# Patient Record
Sex: FEMALE | Race: WHITE | Employment: FULL TIME | URBAN - METROPOLITAN AREA
[De-identification: names, ages, dates, MRNs, and addresses within clinical notes are randomized per-mention and may not be internally consistent; named-entity substitution may affect disease eponyms.]

---

## 2017-04-04 ENCOUNTER — HOSPITAL ENCOUNTER (OUTPATIENT)
Dept: PHYSICAL THERAPY | Age: 52
Discharge: HOME | End: 2017-04-04
Payer: COMMERCIAL

## 2017-04-04 PROCEDURE — 97110 THERAPEUTIC EXERCISES: CPT

## 2017-04-04 PROCEDURE — 97162 PT EVAL MOD COMPLEX 30 MIN: CPT

## 2017-04-04 PROCEDURE — 97033 APP MDLTY 1+IONTPHRSIS EA 15: CPT

## 2017-04-04 NOTE — PROGRESS NOTES
PHYSICAL THERAPY - DAILY TREATMENT NOTE    Patient Name: Brittany Zamorano        Date: 2017  : 1965    Patient  Verified: YES  Visit #:     Insurance: Payor: Amaury Golden / Plan: [facility] PPO / Product Type: PPO /      In time: 1:00 Out time: 1:45   Total Treatment Time: 45     Medicare Time Tracking (below)   Total Timed Codes (min):   1:1 Treatment Time:       TREATMENT AREA/ DIAGNOSIS = Left shoulder pain [M25.512]    SUBJECTIVE  Pain Level (on 0 to 10 scale):  0  / 10   Medication Changes/New allergies or changes in medical history, any new surgeries or procedures? NO    If yes, update Summary List   Subjective Functional Status/Changes:  []  No changes reported   Pt is a 46 y/o female who presents with chief c/o L shoulder pain that has been present a couple years but worsened in the past 6 months with no specific ERIKA. MRI showed biceps tendonitis. Pain ranges from 0-8/10 made worse with lifting, blow drying her hair, reaching across her body and eased with rest, ice, and naproxen. Prior Level of Function: Chronic achy shoulder pain  Current function limitations: IADLs, reaching, lifting  Patient Goal:  \"To heal it. \"     OBJECTIVE  Condition/Posture:   Increased winging of L scapula    ROM:  [] Unable to assess at this time                                           AROM                                                              PROM   Left Right  Left Right   Flexion 135 165 Flexion     Extension   Extension     Abduction 100 170 Abduction     ER @ 0 Degrees   ER @ 0 Degrees     ER @ 90 Degrees 60 90 ER @ 90 Degrees     IR @ 90 Degrees 15 58 IR @ 90 Degrees     Increased PROM flex, ER, and IR on the L compared to AROM; no change in ABD  Pain at all end ranges  Functional IR: R: T5 L: buttocks  Functional ER: R: T7 L: T7    Accessory: WNL   Left Right   Inferior Glide     Anterior Glide     Posterior Glide       Strength:   [] Unable to assess at this time L (1-5) R (1-5) Pain   Flexors P! 5 [] Yes   [] No   Abductors P! 5 [] Yes   [] No   External Rotators   [] Yes   [] No   Internal Rotators   [] Yes   [] No   Supraspinatus 4 min P! 5 [] Yes   [] No   Serratus Anterior   [] Yes   [] No   Lower Trapezius   [] Yes   [] No   Elbow Flexion   [] Yes   [] No   Elbow Extension   [] Yes   [] No     Flexibility:  Pec Minor:  Upper Trap:  Levator Scap:    Scapulohumoral Control / Rhythm:  Able to eccentrically lower with good control?  Left: [] Yes   [] No     Right: [] Yes   [] No   decreased overhead reach L; shoulder shrug    Palpation:  [] Min  [] Mod  [x] Severe    Location: L infraspinatus, ant delt, mid delt, rhomboids  [] Min  [] Mod  [] Severe    Location:  [] Min  [] Mod  [] Severe    Location:    Special Tests:  Adson's Test  [] Pos   [] Neg Yergason's Test [] Pos   [] Neg  Laxmi's Test  [] Pos   [] Neg ER Lag Sign     [] Pos   [] Neg  Neer's Test  [] Pos   [] Neg IR Lift Off Sign  [] Pos   [] Neg  Hawkin's Test  [] Pos   [] Neg AC Joint  [] Pos   [] Neg  Speed's Test  [] Pos   [] Neg SC Joint  [] Pos   [] Neg  Empty Can  [] Pos   [x] Neg Pectoral Tightness [] Pos   [] Neg  Anterior Apprehension [] Pos   [] Neg   Posterior Apprehension [] Pos   [] Neg  Other:     OBJECTIVE TREATMENT:  Modalities Rationale: [x] decrease edema/ inflammation  [x] decrease pain   [] increase tissue extensibility  [] increase muscle performance  [] decrease neural compromise  to improve patient's ability to [] perform ADLs   [] ambulate  [] perform work  [] relaxation/ sleep      min [] Estim, type/location:                                      []  att     []  unatt     []  w/US     []  w/ice    []  w/heat    min []  Mechanical Traction: type/lbs                   []  pro   []  sup   []  int   []  cont    []  before manual    []  after manual    min []  Ultrasound, settings/location:     10 min [x]  Iontophoresis w/ dexamethasone, location: L shoulder                                              [x]  take home patch       []  in clinic    min []  Ice     []  Heat    location/position:     min []  Vasopneumatic Device, press/temp:     min []  Other:    [] Skin assessment post-treatment (if applicable):    []  intact    []  redness- no adverse reaction     []redness  adverse reaction:        10  min Therapeutic Exercise:  [x]  See flow sheet   Rationale:  [x] increase ROM   [x] increase strength   [] increase endurance   [] increase motor control   [] other  to improve patients ability to [x] perform ADLs   [] ambulate  [] perform work  [] relaxation/ sleep       min Manual Therapy: Technique:         Rationale: [] decrease pain   [] decrease TP [] increase ROM/ mobility   [] increase tissue extensibility   [] decrease edema   [] reduce disc [] postural correction   [] other     to improve patient's ability to [] perform ADLs   [] ambulate  [] perform work  [] relaxation/ sleep       min Therapeutic Activity:  [] see flow sheet   Rationale:[] increase ROM   [] increase strength   [] increase balance/ proprioception   [] increase motor control   [] other   to improve patients ability to [] perform ADLs   [] ambulate  [] perform work  [] relaxation/ sleep      min Neuromuscular Re-ed:  [] see flow sheet   Rationale:[] increase ROM   [] increase strength   [] increase balance/ proprioception   [] increase motor control  [] improve safety  [] other    to improve patients ability to[] perform ADLs   [] ambulate  [] perform work  [] relaxation/ sleep                min Gait Training: To improve patients ability to:    [] perform ADLs   [] ambulate       min Patient Education:  Yes    [x] Reviewed HEP   []  Progressed/Changed HEP based on:         Other Objective/Functional Measures:       Post Treatment Pain Level (on 0 to 10) scale:   0  / 10     ASSESSMENT  []  See Progress Note/Recertification      Patient will continue to benefit from skilled PT services to modify and progress therapeutic interventions, address functional mobility deficits, address ROM deficits, address strength deficits, analyze and address soft tissue restrictions, analyze and cue movement patterns, analyze and modify body mechanics/ergonomics, assess and modify postural abnormalities and instruct in home and community integration to attain remaining goals.    Progress toward goals / Updated goals:    [] decr pain   []inc stability   []inc balance [] inc ROM[] inc strength  [] centralizing symptoms                    [] progressing  Function  [] progressing towards LTGs            []  progressing HEP       PLAN  [x]  Upgrade activities as tolerated YES Continue plan of care   []  Discharge due to :    []  Other:      Therapist: Maranda Herring, PT    Date: 4/4/2017 Time: 12:59 PM        Future Appointments  Date Time Provider Rigo Crane   4/4/2017 1:00 PM Maranda Herring, PT [facility] AT [facility]

## 2017-04-04 NOTE — PROGRESS NOTES
René Ortiz [address]  [facility] AT [place]  [address] [facility], [address], [address] - Phone: [phone]  Fax: [phone] / [address] [facility]  Patient Name: Kaye Trejo : 1965   Medical   Diagnosis: Left shoulder pain [M25.512] Treatment Diagnosis: Left shoulder pain [M25.512]   Onset Date: 10/2016     Referral Source: Michael Tirado DO Start of Care [facility]): 2017   Prior Hospitalization: See medical history Provider #: [id number]   Prior Level of Function: Chronic achy pain in L shoulder   Comorbidities: LBP surgery    Medications: Verified on Patient Summary List   The Plan of Care and following information is based on the information from the initial evaluation.   ===========================================================================================  Assessment / key information:  Pt is a 46 y/o female who presents with chief c/o L shoulder pain that has been present a couple years but worsened in the past 6 months with no specific ERIKA. MRI showed biceps tendonitis. Pain ranges from 0-8/10 made worse with lifting, blow drying her hair, reaching across her body and eased with rest, ice, and naproxen. Upon objective evaluation, pt demonstrates L scapular winging at rest, decreased and painful L shoulder A/PROM in all directions (AROM: flex: 135, ABD: 100, ER: 60, IR: 15), L functional IR (reach behind back) to buttocks, pain with resisted L shoulder flex and ABD, elbow flex, tender TrPs in L infraspinatus, ant/mid deltoid, and rhomboids, and should shrug compensation with overhead reach. Pt may benefit from myofascial trigger point dry needling to reduce pain and improve muscle function. Iontophoresis with dexamethasone done today to decrease inflammation. Functional deficits include: IADLs, reaching, and lifting.  A home exercise program was demonstrated and provided to address the above objective and functional deficits. Pt would benefit from PT to address these deficits for increased functional mobility and QOL.  ===========================================================================================  Eval Complexity: History MEDIUM  Complexity : 1-2 comorbidities / personal factors will impact the outcome/ POC ;  Examination  HIGH Complexity : 4+ Standardized tests and measures addressing body structure, function, activity limitation and / or participation in recreation ; Presentation MEDIUM Complexity : Evolving with changing characteristics ; Decision Making MEDIUM Complexity : FOTO score of 26-74; Overall Complexity MEDIUM  Problem List: pain affecting function, decrease ROM, decrease strength, decrease ADL/ functional abilitiies, decrease activity tolerance and decrease flexibility/ joint mobility   Treatment Plan may include any combination of the following: Therapeutic exercise, Therapeutic activities, Neuromuscular re-education, Physical agent/modality and Manual therapy including Myofascial Trigger Point Dry Needling (TDN), and Patient Education  Patient / Family readiness to learn indicated by: asking questions, trying to perform skills and interest  Persons(s) to be included in education: patient (P)  Barriers to Learning/Limitations: None  Measures taken:    Patient Goal (s): \"To heal it. \"   Patient self reported health status: excellent  Rehabilitation Potential: excellent   Short Term Goals: To be accomplished in  3  weeks:  1. Establish initial HEP and pt compliant with instructions  2. Pt will demonstrate increased L shoulder AROM in supine to flex: 145, ABD: 125, ER: 70, IR: 30     Long Term Goals: To be accomplished in  6  weeks:  1. Increase FOTO score to 67 indicating improved function  2. Pt will demonstrate full and pain free L shoulder AROM equal to opposite side  3. Pt will demonstrate 5/5 strength in L shoulder flex, ABD and elbow flex  4.  Pt will demonstrate functional IR (reach behind back) to mid T/S    Frequency / Duration:   Patient to be seen  2  times per week for 6  weeks:  Patient / Caregiver education and instruction: activity modification and exercises  G-Codes (GP): n/a  Therapist Signature: Manjula Julio PT, DPT Date: 9/4/2150   Certification Period: n/a Time: 3:23 PM   ===========================================================================================  I certify that the above Physical Therapy Services are being furnished while the patient is under my care. I agree with the treatment plan and certify that this therapy is necessary. Physician Signature:        Date:       Time:     Please sign and return to [facility] or you may fax the signed copy to [phone]. Thank you.

## 2017-04-07 ENCOUNTER — HOSPITAL ENCOUNTER (OUTPATIENT)
Dept: PHYSICAL THERAPY | Age: 52
Discharge: HOME | End: 2017-04-07
Payer: COMMERCIAL

## 2017-04-07 PROCEDURE — 97110 THERAPEUTIC EXERCISES: CPT

## 2017-04-07 PROCEDURE — 97140 MANUAL THERAPY 1/> REGIONS: CPT

## 2017-04-07 PROCEDURE — 97033 APP MDLTY 1+IONTPHRSIS EA 15: CPT

## 2017-04-07 NOTE — PROGRESS NOTES
PHYSICAL THERAPY - DAILY TREATMENT NOTE      Patient Name: Elizabeth Escalante        Date: 2017  : 1965   YES Patient  Verified  Visit #:      of     Insurance: Payor: Mj Little / Plan: [employer] [place] / Product Type: HMO /      In time: 9:35 Out time: 10:20   Total Treatment Time: 45     Medicare Time Tracking (below)   Total Timed Codes (min):   1:1 Treatment Time:       TREATMENT AREA = Left shoulder pain [M25.512]    SUBJECTIVE    Pain Level (on 0 to 10 scale):  1  / 10   Medication Changes/New allergies or changes in medical history, any new surgeries or procedures? NO    If yes, update Summary List   Subjective Functional Status/Changes:  []  No changes reported     \"It hurt really bad when I was drying my hair this morning but if I keep it still it feels ok. \"       OBJECTIVE    20 min Therapeutic Exercise:  [x]  See flow sheet   Rationale:      increase ROM and increase strength to improve the patients ability to complete ADLs       15 min Manual Therapy:  DTM/TrPR infraspinatus, lat, subscap, pec release, PROM flex, ABD, scap, ER   Rationale:      decrease pain, increase ROM, increase tissue extensibility and decrease trigger points to improve patient's ability to complete ADLs    \Modalities Rationale:     decrease pain to improve patient's ability to complete ADLs      min [] Estim, type/location:                                      []  att     []  unatt     []  w/US     []  w/ice    []  w/heat    min []  Mechanical Traction: type/lbs                   []  pro   []  sup   []  int   []  cont    []  before manual    []  after manual    min []  Ultrasound, settings/location:     10 min [x]  Iontophoresis w/ dexamethasone, location: L shoulder                                              [x]  take home patch       []  in clinic    min []  Ice     []  Heat    location/position:     min []  Vasopneumatic Device, press/temp:     min []  Other:    [] Skin assessment post-treatment (if applicable):    [x]  intact    [x]  redness- no adverse reaction     []redness  adverse reaction:       min Patient Education:  YES  Reviewed HEP   []  Progressed/Changed HEP based on: Other Objective/Functional Measures:    Tender TrPs in infraspinatus and lat/subscap     Post Treatment Pain Level (on 0 to 10) scale:   0  / 10     ASSESSMENT    Assessment/Changes in Function:     Decreased pain following manual and AAROM exercises. Pt may decrease frequency due to finances. []  See Progress Note/Recertification   Patient will continue to benefit from skilled PT services to modify and progress therapeutic interventions, address functional mobility deficits, address ROM deficits, address strength deficits, analyze and address soft tissue restrictions, analyze and cue movement patterns, analyze and modify body mechanics/ergonomics, assess and modify postural abnormalities and instruct in home and community integration to attain remaining goals. to attain remaining goals.    Progress toward goals / Updated goals:    Pt compliant with initial HEP     PLAN    []  Upgrade activities as tolerated YES Continue plan of care   []  Discharge due to :    []  Other:      Therapist: Luis Eduardo Berry PT    Date: 4/7/2017 Time: 9:35 AM   Future Appointments  Date Time Provider Rigo Crane   4/11/2017 10:30 AM Luis Eduardo Berry PT REHAB [facility]ENTER AT [facility]   4/14/2017 10:00 AM Samir Alvarado, PT REHAB CENTER AT [facility]   4/18/2017 2:00 PM Luis Eduardo Berry PT RE[facility]AB [facility]ENTER AT [facility]   4/21/2017 11:00 AM Luis Eduardo Berry PT RE[facility]ENTER AT [facility]   4/25/2017 2:00 PM Luis Eduardo Berry PT RE[facility]ENTER AT [facility]   4/28/2017 11:30 AM Luis Eduardo Berry PT REHAB [facility]ENTER AT [facility]

## 2017-04-11 ENCOUNTER — APPOINTMENT (OUTPATIENT)
Dept: PHYSICAL THERAPY | Age: 52
End: 2017-04-11
Payer: COMMERCIAL

## 2017-04-12 ENCOUNTER — HOSPITAL ENCOUNTER (OUTPATIENT)
Dept: PHYSICAL THERAPY | Age: 52
Discharge: HOME | End: 2017-04-12
Payer: COMMERCIAL

## 2017-04-12 PROCEDURE — 97110 THERAPEUTIC EXERCISES: CPT

## 2017-04-12 NOTE — PROGRESS NOTES
PHYSICAL THERAPY - DAILY TREATMENT NOTE    Patient Name: Jonathan Gonzales        Date: 2017  : 1965    Patient  Verified: YES  Visit #:   3   of   12  Insurance: Payor: Sharifa Hoskins / Plan: [employer] / Product Type: HMO /      In time: 2 Out time: 3:05   Total Treatment Time: 65     Medicare Time Tracking (below)   Total Timed Codes (min):  - 1:1 Treatment Time:  -     TREATMENT AREA/ DIAGNOSIS = Left shoulder pain [M25.512]    SUBJECTIVE  Pain Level (on 0 to 10 scale):  4  / 10   Medication Changes/New allergies or changes in medical history, any new surgeries or procedures?     NO    If yes, update Summary List   Subjective Functional Status/Changes:  []  No changes reported     Functional improvement  It feels better when i leave here  Functional limitation  It hurts when I lift things     OBJECTIVE  Modalities Rationale:     decrease edema, decrease inflammation and decrease pain to improve patient's ability to perform ADLs without pain   min [] Estim, type/location:                                      []  att     []  unatt     []  w/US     []  w/ice    []  w/heat    min []  Mechanical Traction: type/lbs                   []  pro   []  sup   []  int   []  cont    []  before manual    []  after manual    min []  Ultrasound, settings/location:      min []  Iontophoresis w/ dexamethasone, location:                                               []  take home patch       []  in clinic   10 min [x]  Ice     []  Heat    location/position:     min []  Vasopneumatic Device, press/temp:     min []  Other:    [x] Skin assessment post-treatment (if applicable):    [x]  intact    [x]  redness- no adverse reaction     []redness  adverse reaction:        7 min Manual Therapy: DTM to L shoulder/upper arm, GD IV Inf GH mobs, PROM   Rationale:      decrease pain, increase ROM and increase tissue extensibility to improve patient's ability to perform ADLs without pain    48 min Therapeutic Exercise:  [x]  See flow sheet   Rationale:      increase ROM and increase strength to improve the patients ability to perform ADLs without pain          min Patient Education:  Yes    [x] Reviewed HEP   []  Progressed/Changed HEP based on: Other Objective/Functional Measures:    Pt sore in Lshoulder  \"hurts to sleep on L\"  Pain free after therapy  Added more PREs  Reviewed proper psoture   Post Treatment Pain Level (on 0 to 10) scale:   0  / 10     ASSESSMENT  Assessment/Changes in Function:     Pain with attempted post [address] mobs (stopped immedialty)      []  See Progress Note/Recertification   Patient will continue to benefit from skilled PT services to modify and progress therapeutic interventions, address functional mobility deficits, address ROM deficits, address strength deficits, analyze and address soft tissue restrictions, analyze and cue movement patterns, analyze and modify body mechanics/ergonomics and assess and modify postural abnormalities to attain remaining goals.    Progress toward goals / Updated goals:    Pain free after treatment     PLAN  [x]  Upgrade activities as tolerated YES Continue plan of care   []  Discharge due to :    []  Other:      Therapist: Jennifer Billingsley PT    Date: 4/12/2017 Time: 3:43 PM        Future Appointments  Date Time Provider Rigo Crane   4/14/2017 10:00 AM Jennifer Billingsley PT [facility] AT [facility]   4/18/2017 2:00 PM Jazmyn Molina PT [facility]ENTER AT [facility]   4/21/2017 11:00 AM Jennifer Billingsley PT [facility] AT [facility]   4/25/2017 2:00 PM Jazmyn Molina PT [facility]ENTER AT [facility]   4/28/2017 11:30 AM Jazmyn Molina PT RE[facility]ENTER AT [facility]

## 2017-04-14 ENCOUNTER — APPOINTMENT (OUTPATIENT)
Dept: PHYSICAL THERAPY | Age: 52
End: 2017-04-14
Payer: COMMERCIAL

## 2017-04-18 ENCOUNTER — HOSPITAL ENCOUNTER (OUTPATIENT)
Dept: PHYSICAL THERAPY | Age: 52
Discharge: HOME | End: 2017-04-18
Payer: COMMERCIAL

## 2017-04-18 PROCEDURE — 97110 THERAPEUTIC EXERCISES: CPT

## 2017-04-18 NOTE — PROGRESS NOTES
PHYSICAL THERAPY - DAILY TREATMENT NOTE      Patient Name: Kim Mckeon        Date: 2017  : 1965   YES Patient  Verified  Visit #:      of   12  Insurance: Payor: Mandy Pederson / Plan: [facility] / Product Type: HMO /      In time: 2:05 Out time: 2:50   Total Treatment Time: 45     Medicare Time Tracking (below)   Total Timed Codes (min):   1:1 Treatment Time:       TREATMENT AREA = Left shoulder pain [M25.512]    SUBJECTIVE    Pain Level (on 0 to 10 scale):  0  / 10   Medication Changes/New allergies or changes in medical history, any new surgeries or procedures? NO    If yes, update Summary List   Subjective Functional Status/Changes:  []  No changes reported     \"It feels fine if I don't do anything with it. Felt fatigued after last time but felt good. \"       OBJECTIVE    28 min Therapeutic Exercise:  [x]  See flow sheet   Rationale:      increase ROM and increase strength to improve the patients ability to complete ADLs       7 min Manual Therapy:  DTM L shoulder, inf and post GH joint mobs gd 3-4, PROM all planes   Rationale:      decrease pain, increase ROM, increase tissue extensibility and decrease trigger points to improve patient's ability to complete ADLs    Modalities Rationale:     decrease pain to improve patient's ability to complete ADLs      min [] Estim, type/location:                                      []  att     []  unatt     []  w/US     []  w/ice    []  w/heat    min []  Mechanical Traction: type/lbs                   []  pro   []  sup   []  int   []  cont    []  before manual    []  after manual    min []  Ultrasound, settings/location:      min []  Iontophoresis w/ dexamethasone, location:                                               []  take home patch       []  in clinic   10 min [x]  Ice     []  Heat    location/position: L shoulder; supine    min []  Vasopneumatic Device, press/temp:     min []  Other:    [] Skin assessment post-treatment (if applicable): [x]  intact    [x]  redness- no adverse reaction     []redness  adverse reaction:       min Patient Education:  YES  Reviewed HEP   []  Progressed/Changed HEP based on: Other Objective/Functional Measures:    Most limitation and pain with ABD     Post Treatment Pain Level (on 0 to 10) scale:   0  / 10     ASSESSMENT    Assessment/Changes in Function:     Good tolerance to all strengthening exercises. Standing scaption without compensation or pain. []  See Progress Note/Recertification   Patient will continue to benefit from skilled PT services to modify and progress therapeutic interventions, address functional mobility deficits, address ROM deficits, address strength deficits, analyze and address soft tissue restrictions, analyze and cue movement patterns, analyze and modify body mechanics/ergonomics, assess and modify postural abnormalities and instruct in home and community integration to attain remaining goals. to attain remaining goals.    Progress toward goals / Updated goals:    Progressing towards all goals     PLAN    []  Upgrade activities as tolerated YES Continue plan of care   []  Discharge due to :    []  Other:      Therapist: Marry James PT    Date: 4/18/2017 Time: 2:02 PM   Future Appointments  Date Time Provider Rigo Crane   4/21/2017 11:00 AM Rama Uribe PT [facility]ENTER AT [facility]   4/25/2017 2:00 PM Marry James PT [facility]ENTER AT [facility]   4/28/2017 11:30 AM Marry James PT REHAB CENTER AT [facility]

## 2017-04-21 ENCOUNTER — APPOINTMENT (OUTPATIENT)
Dept: PHYSICAL THERAPY | Age: 52
End: 2017-04-21
Payer: COMMERCIAL

## 2017-04-25 ENCOUNTER — APPOINTMENT (OUTPATIENT)
Dept: PHYSICAL THERAPY | Age: 52
End: 2017-04-25
Payer: COMMERCIAL

## 2017-04-26 ENCOUNTER — HOSPITAL ENCOUNTER (OUTPATIENT)
Dept: PHYSICAL THERAPY | Age: 52
End: 2017-04-26
Payer: COMMERCIAL

## 2017-04-28 ENCOUNTER — HOSPITAL ENCOUNTER (OUTPATIENT)
Dept: PHYSICAL THERAPY | Age: 52
Discharge: HOME | End: 2017-04-28
Payer: COMMERCIAL

## 2017-04-28 PROCEDURE — 97110 THERAPEUTIC EXERCISES: CPT

## 2017-04-28 NOTE — PROGRESS NOTES
PHYSICAL THERAPY - DAILY TREATMENT NOTE      Patient Name: Jenna Ye        Date: 2017  : 1965   YES Patient  Verified  Visit #:     Insurance: Payor: Melisa Rodriguez / Plan: Samuel Rodriguezr / Product Type: HMO /      In time: 11:30 Out time: 12:20   Total Treatment Time: 50     Medicare Time Tracking (below)   Total Timed Codes (min):   1:1 Treatment Time:       TREATMENT AREA = Left shoulder pain [M25.512]    SUBJECTIVE    Pain Level (on 0 to 10 scale):  0  / 10   Medication Changes/New allergies or changes in medical history, any new surgeries or procedures? NO    If yes, update Summary List   Subjective Functional Status/Changes:  []  No changes reported     \"I think it's getting a little better. \"       OBJECTIVE    33 min Therapeutic Exercise:  [x]  See flow sheet   Rationale:      increase ROM and increase strength to improve the patients ability to reach, lift       7 min Manual Therapy:  DTM/TrPR L subscap, lat, infraspinatus, serratus ant, gd 4 post GH joint mobs, PROM all planes   Rationale:      decrease pain, increase ROM, increase tissue extensibility and decrease trigger points to improve patient's ability to reach, lift    Modalities Rationale:     decrease pain to improve patient's ability to reach, lift      min [] Estim, type/location:                                      []  att     []  unatt     []  w/US     []  w/ice    []  w/heat    min []  Mechanical Traction: type/lbs                   []  pro   []  sup   []  int   []  cont    []  before manual    []  after manual    min []  Ultrasound, settings/location:      min []  Iontophoresis w/ dexamethasone, location:                                               []  take home patch       []  in clinic   10 min [x]  Ice     []  Heat    location/position: L shoulder; supine    min []  Vasopneumatic Device, press/temp:     min []  Other:    [] Skin assessment post-treatment (if applicable):    [x]  intact    [x] redness- no adverse reaction     []redness  adverse reaction:       min Patient Education:  YES  Reviewed HEP   []  Progressed/Changed HEP based on: Other Objective/Functional Measures: Only able to do T's and Y's through part of the range without pain. Updated HEP   Post Treatment Pain Level (on 0 to 10) scale:   0  / 10     ASSESSMENT    Assessment/Changes in Function:     Pt reports most pain with folding laundry, cleaning dishes. Improved PROM ABD following manual therapy. []  See Progress Note/Recertification   Patient will continue to benefit from skilled PT services to modify and progress therapeutic interventions, address functional mobility deficits, address ROM deficits, address strength deficits, analyze and address soft tissue restrictions, analyze and cue movement patterns, analyze and modify body mechanics/ergonomics, assess and modify postural abnormalities and instruct in home and community integration to attain remaining goals. to attain remaining goals. Progress toward goals / Updated goals:    Progressing towards all goals     PLAN    []  Upgrade activities as tolerated YES Continue plan of care   []  Discharge due to :    []  Other:      Therapist: Clara Conley PT    Date: 4/28/2017 Time: 11:40 AM   No future appointments.

## 2017-07-03 NOTE — PROGRESS NOTES
[facility] PHYSICAL THERAPY AT [address], [address] [place] [address], [address] [facility], [address] [place] [address]  Phone: [phone]  Fax: [phone] SUMMARY  Patient Name: Jose Lam : 1965   Treatment/Medical Diagnosis: Left shoulder pain [M25.512]   Referral Source: Breanna Guy DO     Date of Initial Visit: 17 Attended Visits: 5 Missed Visits: 3     SUMMARY OF TREATMENT  Physical therapy has consisted of therapeutic exercise and neuromuscular re-education for L shoulder ROM and strengthening, manual therapy including DTM, TrPR, and PROM, pt education for activity modification and HEP, and ice and iontophoresis with dexamethasone for pain relief. CURRENT STATUS  Unable to complete formal re-assessment, as pt did not return to PT after 5th visit on 17. At last visit, pt demonstrated improved ROM but continued to have pain with folding laundry and cleaning dishes. Previous Goals:  1. Establish initial HEP and pt compliant with instructions  2. Pt will demonstrate increased L shoulder AROM in supine to flex: 145, ABD: 125, ER: 70, IR: 30     Prior Level/Current Level:  1) Prior Level: n/a   Current Level: pt compliant with initial HEP   Goal Met? yes  2) Prior Level: flex: 135, ABD: 100, ER: 60, IR: 15   Current Level: unable to assess   Goal Met? n/a    RECOMMENDATIONS  Discontinue therapy due to lack of attendance or compliance. If you have any questions/comments please contact us directly at [phone]. Thank you for allowing us to assist in the care of your patient.     Therapist Signature: Poncho Pi, PT, DPT Date: 7/3/17     Time: 2:23 PM

## 2018-01-03 ENCOUNTER — HOSPITAL ENCOUNTER (OUTPATIENT)
Dept: PHYSICAL THERAPY | Age: 53
Discharge: HOME | End: 2018-01-03
Payer: COMMERCIAL

## 2018-01-03 PROCEDURE — 97110 THERAPEUTIC EXERCISES: CPT

## 2018-01-03 PROCEDURE — 97161 PT EVAL LOW COMPLEX 20 MIN: CPT

## 2018-01-03 NOTE — PROGRESS NOTES
PHYSICAL THERAPY - DAILY TREATMENT NOTE    Patient Name: Ashlee Vazquez        Date: 1/3/2018  : 1965    Patient  Verified: yes  Visit #:   1     Insurance: Payor: Lilliam Jose / Plan: [facility] PPO / Product Type: PPO /      In time: 200 Out time: 250   Total Treatment Time: 50     Medicare Time Tracking (below)   Total Timed Codes (min):   1:1 Treatment Time:       TREATMENT AREA =  Left ankle pain [M25.572]    SUBJECTIVE  Pain Level (on 0 to 10 scale):  3  / 10   Medication Changes/New allergies or changes in medical history, any new surgeries or procedures?     NO    If yes, update Summary List   Subjective Functional Status/Changes:  []  No changes reported     See EVAL/ POC         OBJECTIVE  Modalities Rationale: to decrease pain, edema, neural compromise in order to perform ADLs/ rest with improved ease        min [] Estim, type/location:                                      []  att     []  unatt     []  w/US     []  w/ice    []  w/heat    min []  Mechanical Traction: type/lbs                   []  pro   []  sup   []  int   []  cont    []  before manual    []  after manual    min []  Ultrasound, settings/location:      min []  Iontophoresis w/ dexamethasone, location:                                               []  take home patch       []  in clinic   10 min [x]  Ice     []  Heat    location/position:     min []  Vasopneumatic Device, press/temp:     min []  Other:    [] Skin assessment post-treatment (if applicable):    []  intact    []  redness- no adverse reaction     []redness  adverse reaction:        15 min Therapeutic Exercise:  [x]  See flow sheet   Rationale:      increase ROM and increase strength to improve the patients ability to perform ADLs      min Manual Therapy:    Rationale:      decrease pain, increase ROM, increase tissue extensibility and decrease trigger points to improve patient's ability to perform ADLs     min Neuromuscular Re-ed:    Rationale: improve coordination, improve balance, increase proprioception and improved posture, improve motor control to improve the patients ability to perform ADLs     min Gait Training:    Rationale:       min Patient Education:  YES  Reviewed HEP   []  Progressed/Changed HEP based on: Other Objective/Functional Measures:    See EVAL/ POC     Post Treatment Pain Level (on 0 to 10) scale:   3  / 10     ASSESSMENT      [x]  See POC     PLAN  [x]  Upgrade activities as tolerated  Continue plan of care: yes   []  Discharge due to :    []  Other:      Therapist: Yamileth Fan PT    Date: 1/3/2018 Time: 2:40 PM     No future appointments.

## 2018-01-03 NOTE — PROGRESS NOTES
[facility] PHYSICAL THERAPY AT [facility] [address], [address] - Phone: [phone]  Fax: [phone] / [address] [facility]  Patient Name: Reece Piedra : 1965   Medical   Diagnosis: Left ankle pain [M25.572] Treatment Diagnosis: L ankle sprain   Onset Date: 17     Referral Source: Demar Bonilla MD Start of Care [facility]): 1/3/2018   Prior Hospitalization: See medical history Provider #: [id number]   Prior Level of Function: No pain with ADLs   Comorbidities: na   Medications: Verified on Patient Summary List   The Plan of Care and following information is based on the information from the initial evaluation.   ================================================================  Assessment / key information: Reece Piedra is a 47 y.o.  yo female with Dx of Left ankle pain [M25.572]. She reports injury to her L ankle when she mis-stepped coming down her stairs, causing the L ankle to roll. She reports immediate sharp apin and swelling. She was initially seen at a local ER where x-rays were taken and reported negative. She was given an ankle brace and crutches. She f/u with orthopaedics yesterday. Ms. Jose Juan Alcala presents to PT wearing an ankle support. Mild edema is present. She is tender to palpation in the l ATF ligament region as well as along the distal tibia and lateral malleolus. Ankle ROM (R/L): DF= 14/10, PF= 75/48, IN= 40/24, EV= 33/18 degrees. Strength is WNLs, however, she reports discomfort with resisted eversion. SLS: R= 15/15 seconds, L= 5/15 seconds.   FOTO= 47%  ================================================================  Eval Complexity: History LOW Complexity : Zero comorbidities / personal factors that will impact the outcome / POC;  Examination  MEDIUM Complexity : 3 Standardized tests and measures addressing body structure, function, activity limitation and / or participation in recreation ; Presentation LOW Complexity : Stable, uncomplicated ;  Decision Making MEDIUM Complexity : FOTO score of 26-74; Overall Complexity LOW   Problem List: pain affecting function, decrease ROM, decrease strength, edema affecting function, decrease ADL/ functional abilitiies and decrease activity tolerance   Treatment Plan may include any combination of the following: Therapeutic exercise, Therapeutic activities, Neuromuscular re-education, Physical agent/modality, Gait/balance training, Manual therapy and Patient education  Patient / Family readiness to learn indicated by: asking questions, trying to perform skills and interest  Persons(s) to be included in education: patient (P)  Barriers to Learning/Limitations: None  Measures taken:    Patient Goal (s): Heal/ strengthen   Patient self reported health status: good  Rehabilitation Potential: excellent     Short Term Goals: To be accomplished in  2  weeks:  1 Patient will report >= 25% improvement in symptoms with ADLs  2 Patient will be educated in appropriate ROM, stabilization, strengthening exercises.  Long Term Goals: To be accomplished in  4-6  weeks:  1 Patient to report >= 75% improvement in symptoms with ADLs. 2 Patient will be independent with finalized HEP/ self maintenance. 3 Increase FOTO score >= 74%% to indicate improved function with use of CS.  4 Restore full AROM for improved ADL participation.   5 Patient to demonstrate improved balance with ability to sustain SLS >= 30/30 seconds for improved recreational activity    Frequency / Duration:   Patient to be seen  2  times per week for 4-6  weeks:  Patient / Caregiver education and instruction: self care, activity modification, brace/ splint application and exercises  G-Codes (GP): nic  Therapist Signature: Jeb Cline PT Date: 4/1/3432   Certification Period:  Time: 2:42 PM   ===================================================================  I certify that the above Physical Therapy Services are being furnished while the patient is under my care. I agree with the treatment plan and certify that this therapy is necessary. Physician Signature:        Date:       Time:     Please sign and return to [facility] or you may fax the signed copy to [phone]. Thank you.

## 2018-01-09 ENCOUNTER — HOSPITAL ENCOUNTER (OUTPATIENT)
Dept: PHYSICAL THERAPY | Age: 53
Discharge: HOME | End: 2018-01-09
Payer: COMMERCIAL

## 2018-01-09 PROCEDURE — 97110 THERAPEUTIC EXERCISES: CPT

## 2018-01-09 PROCEDURE — 97140 MANUAL THERAPY 1/> REGIONS: CPT

## 2018-01-09 NOTE — PROGRESS NOTES
PHYSICAL THERAPY - DAILY TREATMENT NOTE    Patient Name: Joshua Figueroa        Date: 2018  : 1965    Patient  Verified: YES  Visit #:      of   12  Insurance: Payor: Mikael Farrell / Plan: [facility] PPO / Product Type: PPO /       In time: 6:10 Out time: 6:55   Total Treatment Time: 45     Medicare Time Tracking (below)   Total Timed Codes (min):    1:1 Treatment Time:  -     TREATMENT AREA/ DIAGNOSIS = Left ankle pain [M25.572]    SUBJECTIVE  Pain Level (on 0 to 10 scale):  0  / 10   Medication Changes/New allergies or changes in medical history, any new surgeries or procedures?     NO    If yes, update Summary List   Subjective Functional Status/Changes:  []  No changes reported     Functional improvement no pain at rest   Functional limitation       OBJECTIVE  Modalities Rationale:     decrease edema, decrease inflammation and decrease pain to improve patient's ability to perform ADLs without pain   min [] Estim, type/location:                                      []  att     []  unatt     []  w/US     []  w/ice    []  w/heat    min []  Mechanical Traction: type/lbs                   []  pro   []  sup   []  int   []  cont    []  before manual    []  after manual    min []  Ultrasound, settings/location:      min []  Iontophoresis w/ dexamethasone, location:                                               []  take home patch       []  in clinic   10 min [x]  Ice     []  Heat    location/position:     min []  Vasopneumatic Device, press/temp:     min []  Other:    [x] Skin assessment post-treatment (if applicable):    [x]  intact    [x]  redness- no adverse reaction     []redness  adverse reaction:        15 min Manual Therapy: Gentle CFM to lateral ankle ligs, DTM to calf, retrograde massage, passive calf stretch    Rationale:      decrease pain, increase ROM and increase tissue extensibility to improve patient's ability to perform ADLs without pain    20 min Therapeutic Exercise:  [x] See flow sheet   Rationale:      increase ROM and increase strength to improve the patients ability to perform ADLs without pain          min Patient Education:  Yes    [x] Reviewed HEP   []  Progressed/Changed HEP based on: Other Objective/Functional Measures:    Pt with continued edema and bruising in lateral ankle  Progressed to standing HR and calf stretch  Able to SLS > 15 seconds L without much discomfort  No ant ankle jamming with soleus stretch   Post Treatment Pain Level (on 0 to 10) scale:   0  / 10     ASSESSMENT  Assessment/Changes in Function:     No ant ankle pain     []  See Progress Note/Recertification   Patient will continue to benefit from skilled PT services to modify and progress therapeutic interventions, address functional mobility deficits, address ROM deficits, address strength deficits, analyze and address soft tissue restrictions, analyze and cue movement patterns and analyze and modify body mechanics/ergonomics to attain remaining goals.    Progress toward goals / Updated goals:    Less pain and improved balance     PLAN  [x]  Upgrade activities as tolerated YES Continue plan of care   []  Discharge due to :    []  Other:      Therapist: Leon Klein PT    Date: 1/9/2018 Time: 6:52 PM        Future Appointments  Date Time Provider Rigo Crane   1/11/2018 6:00 PM [facility]   1/16/2018 6:00 PM Leon Klein PT [facility]   1/18/2018 6:00 PM Leon Klein PT [facility]   1/23/2018 6:00 PM Leon Klein PT [facility]   1/25/2018 6:00 PM Leon Klein PT [facility]   1/29/2018 4:30 PM Luz López PT [facility]   2/1/2018 6:00 PM Leon Klein PT [facility]

## 2018-01-16 ENCOUNTER — APPOINTMENT (OUTPATIENT)
Dept: PHYSICAL THERAPY | Age: 53
End: 2018-01-16
Payer: COMMERCIAL

## 2018-01-17 ENCOUNTER — HOSPITAL ENCOUNTER (OUTPATIENT)
Dept: PHYSICAL THERAPY | Age: 53
Discharge: HOME | End: 2018-01-17
Payer: COMMERCIAL

## 2018-01-17 PROCEDURE — 97140 MANUAL THERAPY 1/> REGIONS: CPT

## 2018-01-17 PROCEDURE — 97110 THERAPEUTIC EXERCISES: CPT

## 2018-01-17 NOTE — PROGRESS NOTES
PHYSICAL THERAPY - DAILY TREATMENT NOTE    Patient Name: Shiloh Ledbetter        Date: 2018  : 1965   YES Patient  Verified  Visit #:   3   of   12  Insurance: Payor: Edward Mark / Plan: [facility] PPO / Product Type: PPO /      In time: 1230 Out time: 125   Total Treatment Time: 55     Medicare Time Tracking (below)   Total Timed Codes (min):   1:1 Treatment Time:       TREATMENT AREA = Left ankle pain [M25.572]    SUBJECTIVE  Pain Level (on 0 to 10 scale):  3-4  / 10   Medication Changes/New allergies or changes in medical history, any new surgeries or procedures? NO    If yes, update Summary List   Subjective Functional Status/Changes:  []  No changes reported     More sore with walking.   Sore this week, but have been on it more        OBJECTIVE    30 min Therapeutic Exercise:  [x]  See flow sheet   Rationale:      increase ROM, increase strength, improve coordination and improve balance to improve the patients ability to amb/ perform rec     15 min Manual Therapy: Technique:      [x] STM[]IASTM[x]TPR[]PROM[x] Stretching  [] SOR[] man traction[x] retrogrademsg  []OP with REIL  []Jt manipulation []Gr I [] II []  III [] IV[] V[]    Treatment Area:  L ankle/ calf   Rationale:      decrease pain, increase ROM, increase tissue extensibility and decrease trigger points to improve patient's ability to amb with less pain        Modalities Rationale:     decrease inflammation and decrease pain to improve patient's ability to amb/ stand longer duration   min [] Estim, type/location:                                      []  att     []  unatt     []  w/US     []  w/ice    []  w/heat    min []  Mechanical Traction: type/lbs                   []  pro   []  sup   []  int   []  cont    []  before manual    []  after manual    min []  Ultrasound, settings/location:      min []  Iontophoresis w/ dexamethasone, location:                                               []  take home patch       []  in clinic   10 min [x]  Ice     []  Heat    location/position:     min []  Vasopneumatic Device, press/temp:     min []  Other:    [x] Skin assessment post-treatment (if applicable):    [x]  intact    [x]  redness- no adverse reaction     []redness  adverse reaction:         min Gait Training:    Rationale:        throughout Rx min Patient Education:  YES  Reviewed HEP   []  Progressed/Changed HEP based on: Other Objective/Functional Measures:    Difficulty with SLS- 11 sec longest duration before LOB    Added squats and step ups for strength/ stability   Post Treatment Pain Level (on 0 to 10) scale:   0  / 10     ASSESSMENT  Assessment/Changes in Function:     Functional improvement:  Walking more   Functional limitation:  Pain with prolonged amb      []  See Progress Note/Recertification   Patient will continue to benefit from skilled PT services to modify and progress therapeutic interventions, address functional mobility deficits, address ROM deficits, address strength deficits, analyze and address soft tissue restrictions and analyze and cue movement patterns to attain remaining goals.    Progress toward goals / Updated goals:    Steady progress     PLAN  []  Upgrade activities as tolerated YES Continue plan of care   []  Discharge due to :    []  Other:      Therapist: Aidan Gupta PT    Date: 1/17/2018 Time: 12:28 PM     Future Appointments  Date Time Provider Rigo Crane   1/17/2018 12:30 PM Aidan Gupta PT [facility]   1/18/2018 6:00 PM Julio Cesar Hernández PT [facility]   1/23/2018 6:00 PM Julio Cesar Hernández PT [facility]   1/25/2018 6:00 PM Julio Cesar Hernández PT [facility]   1/29/2018 4:30 PM Aidan Gupta PT [facility]   2/1/2018 6:00 PM Julio Cesar Hernández PT [facility]

## 2018-01-18 ENCOUNTER — APPOINTMENT (OUTPATIENT)
Dept: PHYSICAL THERAPY | Age: 53
End: 2018-01-18
Payer: COMMERCIAL

## 2018-01-23 ENCOUNTER — HOSPITAL ENCOUNTER (OUTPATIENT)
Dept: PHYSICAL THERAPY | Age: 53
Discharge: HOME | End: 2018-01-23
Payer: COMMERCIAL

## 2018-01-23 PROCEDURE — 97140 MANUAL THERAPY 1/> REGIONS: CPT

## 2018-01-23 PROCEDURE — 97110 THERAPEUTIC EXERCISES: CPT

## 2018-01-23 NOTE — PROGRESS NOTES
PHYSICAL THERAPY - DAILY TREATMENT NOTE    Patient Name: Landa Hodgkins        Date: 2018  : 1965    Patient  Verified: YES  Visit #:   4   of   12  Insurance: Payor: Stephany Douglas / Plan: [facility] PPO / Product Type: PPO /      In time: 5:50 Out time: 6:50   Total Treatment Time: 60     Medicare Time Tracking (below)   Total Timed Codes (min):  - 1:1 Treatment Time:  -     TREATMENT AREA/ DIAGNOSIS = Left ankle pain [M25.572]    SUBJECTIVE  Pain Level (on 0 to 10 scale):  2  / 10   Medication Changes/New allergies or changes in medical history, any new surgeries or procedures? NO    If yes, update Summary List   Subjective Functional Status/Changes:  []  No changes reported     Functional improvement: Pt reports that she is able to tolerate weight bearing activities longer than she has been able to. Functional limitation: Pt reports that she is still having trouble descending stairs. OBJECTIVE  Modalities Rationale:     decrease edema, decrease inflammation and decrease pain to improve patient's ability to perform ADLs without pain   min [] Estim, type/location:                                      []  att     []  unatt     []  w/US     []  w/ice    []  w/heat    min []  Mechanical Traction: type/lbs                   []  pro   []  sup   []  int   []  cont    []  before manual    []  after manual    min []  Ultrasound, settings/location:      min []  Iontophoresis w/ dexamethasone, location:                                               []  take home patch       []  in clinic   10 min [x]  Ice     []  Heat    location/position: L ankle    min []  Vasopneumatic Device, press/temp:     min []  Other:    [] Skin assessment post-treatment (if applicable):    []  intact    []  redness- no adverse reaction     []redness  adverse reaction:        10 min Manual Therapy: DTM to L gastroc. PROM of L ankle in all planes.  Distraction of talocrural joint grade IV mobilization Rationale:      decrease pain, increase ROM, increase tissue extensibility and decrease edema  to improve patient's ability to perform ADLs without pain    40 min Therapeutic Exercise:  [x]  See flow sheet   Rationale:      increase ROM, increase strength, improve coordination, improve balance and increase proprioception to improve the patients ability to perform ADLs without pain          min Patient Education:  Yes    [x] Reviewed HEP   []  Progressed/Changed HEP based on: Other Objective/Functional Measures:    PROM of left ankle in all directions WFL. Pt has great squat mechanics and requires non to occasional verbal instruction for proper mechanics. Post Treatment Pain Level (on 0 to 10) scale:   0  / 10     ASSESSMENT  Assessment/Changes in Function:     Pt shows improved activity tolerance based on increases in therapeutic exercise resistance. Pt was able to progress current exercises to add more dynamic stability activities of the L LE. Pt still feels slight pressure in LE with prolonged weightbearing exercises. []  See Progress Note/Recertification   Patient will continue to benefit from skilled PT services to modify and progress therapeutic interventions, address functional mobility deficits, address ROM deficits, address strength deficits, analyze and address soft tissue restrictions, analyze and cue movement patterns and analyze and modify body mechanics/ergonomics to attain remaining goals. Progress toward goals / Updated goals:    Continue current treatment program targeting L LE dynamic stabilizers and LE strength.        PLAN  [x]  Upgrade activities as tolerated YES Continue plan of care   []  Discharge due to :    []  Other:      Therapist: Hermelindo Bass    Date: 1/23/2018 Time: 6:41 PM        Future Appointments  Date Time Provider Rigo Crane   1/25/2018 6:00 PM Mariusz Hensley, PT [facility]   1/29/2018 4:30 PM Tanya Ochoa, PT [facility]   2/1/2018 6:00 PM Julio Kevin, PT [facility]

## 2018-01-25 ENCOUNTER — HOSPITAL ENCOUNTER (OUTPATIENT)
Dept: PHYSICAL THERAPY | Age: 53
Discharge: HOME | End: 2018-01-25
Payer: COMMERCIAL

## 2018-01-25 PROCEDURE — 97110 THERAPEUTIC EXERCISES: CPT

## 2018-01-25 PROCEDURE — 97140 MANUAL THERAPY 1/> REGIONS: CPT

## 2018-01-25 NOTE — PROGRESS NOTES
PHYSICAL THERAPY - DAILY TREATMENT NOTE    Patient Name: Leobardo Sumnre        Date: 2018  : 1965    Patient  Verified: YES  Visit #:      12  Insurance: Payor: Leena Jacobo / Plan: [facility] PPO / Product Type: PPO /      In time: 5:35 Out time: 6:30   Total Treatment Time: 55     Medicare Time Tracking (below)   Total Timed Codes (min):  - 1:1 Treatment Time:  -     TREATMENT AREA/ DIAGNOSIS = Left ankle pain [M25.572]    SUBJECTIVE  Pain Level (on 0 to 10 scale):  1  / 10   Medication Changes/New allergies or changes in medical history, any new surgeries or procedures? NO    If yes, update Summary List   Subjective Functional Status/Changes:  []  No changes reported     Functional improvement: Pt reports no new changes to the ankle since last visit. Pt does think she is able to walk on the involved ankle for longer periods now. Functional limitation: Pt reports that she is still having trouble with descending stairs. OBJECTIVE      15 min Manual Therapy: DTM to L gastroc. PROM of L ankle in all planes. Grade 2 talocrural distraction. Grade 2 AP Talocrural in standing with involved extremity on a 4 inch block. Rationale:      decrease pain and increase tissue extensibility to improve patient's ability to perform ADLs without pain    40 min Therapeutic Exercise:  [x]  See flow sheet   Rationale:      increase strength, improve coordination, improve balance and increase proprioception to improve the patients ability to perform ADLs without pain          min Patient Education:  Yes    [x] Reviewed HEP   []  Progressed/Changed HEP based on: Other Objective/Functional Measures:    ROM of L ankle WFL     Post Treatment Pain Level (on 0 to 10) scale:   0  / 10     ASSESSMENT  Assessment/Changes in Function:     Pt showed improvements in activity tolerance but still has complaints of pain with descending stairs.  Pt benefited from standing mobilization to talocrural joint based on decreased symptoms provocation during stair descent. Pt still shows decreased balance and proprioception on L LE compared to R LE during balance tasks. []  See Progress Note/Recertification   Patient will continue to benefit from skilled PT services to modify and progress therapeutic interventions, address functional mobility deficits, address strength deficits, analyze and address soft tissue restrictions, analyze and cue movement patterns and analyze and modify body mechanics/ergonomics to attain remaining goals. Progress toward goals / Updated goals:    Continue with current treatment plan targeting ankle strengthening and stabilization.      PLAN  [x]  Upgrade activities as tolerated YES Continue plan of care   []  Discharge due to :    []  Other:      Therapist: Viji Carlin    Date: 1/25/2018 Time: 6:05 PM        Future Appointments  Date Time Provider Rigo Crane   1/29/2018 4:30 PM Rachelle Moy, PT [facility]   2/1/2018 6:00 PM Jenna White, PT [facility]

## 2018-02-01 ENCOUNTER — APPOINTMENT (OUTPATIENT)
Dept: PHYSICAL THERAPY | Age: 53
End: 2018-02-01

## 2018-03-09 NOTE — PROGRESS NOTES
[facility] PHYSICAL THERAPY AT [address], [address] [place] [address], [address] [facility], [address] [place] [address]  Phone: [phone]  Fax: [phone] SUMMARY  Patient Name: Huey Liriano : 1965   Treatment/Medical Diagnosis: Left ankle pain [M25.572]   Referral Source: Damian Meraz MD     Date of Initial Visit: 1.3/18 Attended Visits: 5 Missed Visits: 5     SUMMARY OF TREATMENT  Manual therapy, including massage, joint mobs and PROM. LE strengthening, with focus on ankle stability and balance. Ice. CURRENT STATUS  The patient had inconsistent attendance and has not returned since her 18 visit and has been DCed. Unable to assess her progress towards her LTGs, but she was having less pain, less swelling and improved ROM. RECOMMENDATIONS  Discontinue therapy due to lack of attendance or compliance. If you have any questions/comments please contact us directly at [phone]. Thank you for allowing us to assist in the care of your patient. Therapist Signature:  Gil Delcid, PT Date: 3/13/18     Time: 9:45 AM

## 2019-04-08 ENCOUNTER — HOSPITAL ENCOUNTER (OUTPATIENT)
Dept: PHYSICAL THERAPY | Age: 54
Discharge: HOME | End: 2019-04-08
Payer: COMMERCIAL

## 2019-04-08 PROCEDURE — 97140 MANUAL THERAPY 1/> REGIONS: CPT

## 2019-04-08 PROCEDURE — 97032 APPL MODALITY 1+ESTIM EA 15: CPT

## 2019-04-08 PROCEDURE — 97161 PT EVAL LOW COMPLEX 20 MIN: CPT

## 2019-04-08 NOTE — PROGRESS NOTES
PHYSICAL THERAPY - DAILY TREATMENT NOTE - DRY NEEDLE NOTE Patient Name: Estephanie Callahan        Date: 2019 : 1965   YES  Patient  Verified Visit #:   1     Insurance: Payor: BLUE CROSS / Plan: [facility] PPO / Product Type: PPO / In time: 835 Out time: 925 Total Treatment Time: 50 Medicare Time Tracking (below) Total Timed Codes (min):  30 1:1 Treatment Time:  30 TREATMENT AREA = Left leg pain [M79.605] SUBJECTIVE Pain Level (on 0 to 10 scale):  1  / 10 Medication Changes/New allergies or changes in medical history, any new surgeries or procedures? NO    If yes, update Summary List  
Subjective Functional Status/Changes:  []  No changes reported See eval/ poc OBJECTIVE Modalities Rationale:     decrease pain, increase tissue extensibility, decrease trigger point density and increase stability/ ROM within muscle to improve patient's ability to perform ADLs/ return to PLOF 10 min [x] Estim, type/location:Direct using Pointer Excel II ( between 1-16 HZ) to DN [x]  att     []  unatt     [x]  W/dry needling     []  w/ice    []  w/heat 
 min []  Mechanical Traction: type/lbs   
               []  pro   []  sup   []  int   []  cont    []  before manual    []  after manual  
 min []  Ultrasound, settings/location:    
 min []  Iontophoresis w/ dexamethasone, location:   
                                           []  take home patch       []  in clinic  
 min []  Ice     []  Heat    location/position:   
 min []  Vasopneumatic Device, press/temp:   
 min []  Other:   
[x] Skin assessment post-treatment (if applicable):   
[x]  intact    []  redness- no adverse reaction    
[]redness  adverse reaction:     
 
10 min Manual Therapy: Palpation, assessment of TP and DN -(needle insertion time not included)  Rationale:     decrease pain, increase tissue extensibility, decrease trigger point density and increase stability within muscle to improve patient's ability to perform ADLs/ return to PLOF 10 min Therapeutic Exercise:  [x]  See flow sheet Modalities Rationale:     decrease pain, increase tissue extensibility, decrease trigger point density and increase stability within muscle to improve patient's ability to perform ADLs/ return to PLOF Billed With/As: 
 [] TE 
 [x] MT 
 [] Neuro [x] modalities Patient Education: [x] Review HEP [] Progressed/Changed HEP based on:  
[] positioning   [] body mechanics   [x] indication/ precautions/ expectations of DN 
  [] heat/ice application   
[] other: OTHER OBJECTIVE/FUNCTIONAL MEASURES: 
 
Dry Needling Procedure Note Dry Needle Session Number:  1 Procedure: An intramuscular manual therapy (dry needling) and a neuro-muscular re-education treatment was done to deactivate myofascial trigger points, with a 15/30 gauge solid filament needle, under aseptic technique. Indication(s): [] Muscle spasms [] Myalgia/Myositis  [] Muscle cramps  
   [] Muscle imbalances [] TMD (TMJ) [] Myofascial pain & dysfunction 
   [] Other: __ Chart reviewed for the following: 
Patrick TSANG PT, have reviewed the medical history, summary list and precautions/contraindications for [employer]. TIME OUT performed immediately prior to start of procedure: 
855 (enter time the timeout was completed) Patrick TSANG PT, have performed the following reviews on [employer] prior to the start of the session:     
[x] Patient was identified by name and date of birth   
[x] Agreement on all muscles being treated was verified  
[x] Purpose of dry needling, side effects, possible complications, risks and benefits were explained to the patient [x] Procedure site(s) verified 
[x] Patient was positioned for comfort and draped for privacy [x] Informed Consent was signed (initial visit) and verified verbally (subsequent visits) [x] Patient was instructed on the need to report the use of blood thinners and/or immunosuppressant medications [x] How to respond to possible adverse effects of treatment 
[x] Self treatment of post needling soreness: ice, heat (moist heat, heat wraps) and stretching 
[x] Opportunity was given to ask any questions, all questions were answered Treatment: The following muscles were treated today (needle insertion with with direct ESTIM): 
 
Right:   
Left: HS- med/ lat Patients response to todays treatment:  
[x]  LTRs  [x]  Muscle Relaxation  []  Pain Relief  []  Decreased Tinnitus 
[]  Decreased HAs [x]  Post needling soreness []  Increased ROM []  Other:   
 
 
  
Post Treatment Pain Level (on 0 to 10) scale:   1  / 10 ASSESSMENT Assessment/Changes in Function:  
 
See eval/ poc 
  
[]  See Progress Note/Recertification Patient will continue to benefit from skilled PT services to modify and progress therapeutic interventions, address functional mobility deficits, address ROM deficits, address strength deficits, analyze and address soft tissue restrictions and analyze and cue movement patterns to attain remaining goals. Progress toward goals / Updated goals: 
See eval/ poc PLAN [x]  Upgrade activities as tolerated YES  Continue plan of care  
[]  Discharge due to :   
[]  Other:   
 
Therapist: Anne Morales PT Date: 4/8/2019 Time: 9:20 AM  
 
No future appointments.

## 2019-04-08 NOTE — PROGRESS NOTES
René Ortiz [address] [facility] [place] PHYSICAL THERAPY AT [address] [facility], [address] [place] [address], [address] [place] [address], [address] [place] [address] - Phone: [phone]  Fax: [phone] PLAN OF CARE / STATEMENT OF MEDICAL NECESSITY FOR PHYSICAL THERAPY SERVICES Patient Name: Stephan Yadav : 1965 Medical  
Diagnosis: Left leg pain [M79.605] Treatment Diagnosis: L hamstring strain Onset Date: 19 Referral Source: Alexus Cook MD Start of Care [facility] [place]): 2019 Prior Hospitalization: See medical history Provider #: [id number] Prior Level of Function: No pain in L leg with ADLs Comorbidities: na  
Medications: Verified on Patient Summary List  
The Plan of Care and following information is based on the information from the initial evaluation.  
================================================================ Assessment / parkinson information: Stephan Yadav is a 46 y.o.  yo female with Dx of Left leg pain [M79.605]. She reports injuring the L knee when she tripped getting into a boat, landing on the L knee and shoulder. The next day, she reports pain with lifting the left leg and bending the left knee. Pain has since improved, but she continues with ongoing soreness behind the left knee. Pain is made worse with squatting, kneeling and prolonged standing. On assessment, Ms. Kwabena Soliman is tender to palpation in the left lateral popliteal fossa and along the lateral hamstring attachment. No edema is present. ROM is WNLs. Strength is WNLs but she is painful and mildly weak with resisted knee flex. Ligaments appear intact. FOTO score= 35%.================================================================ Eval Complexity: History LOW Complexity : Zero comorbidities / personal factors that will impact the outcome / POC;  Examination  MEDIUM Complexity : 3 Standardized tests and measures addressing body structure, function, activity limitation and / or participation in recreation ; Presentation LOW Complexity : Stable, uncomplicated ;  Decision Making MEDIUM Complexity : FOTO score of 26-74; Overall Complexity LOW Problem List: pain affecting function, decrease strength, impaired gait/ balance, decrease ADL/ functional abilitiies and decrease activity tolerance Treatment Plan may include any combination of the following: Therapeutic exercise, Therapeutic activities, Neuromuscular re-education, Physical agent/modality, Gait/balance training, Manual therapy, Patient education and Other: dry needling Patient / Family readiness to learn indicated by: asking questions, trying to perform skills and interest 
Persons(s) to be included in education: patient (P) Barriers to Learning/Limitations: None Measures taken, if barriers to learning:   
Patient Goal (s): Heal hamstring Patient self reported health status: good Rehabilitation Potential: excellent ? Short Term Goals: To be accomplished in  2  weeks: 
1 Patient will report >= 25% improvement in symptoms with ADLs 2 Patient will be educated in appropriate ROM, stabilization, strengthening exercises. ? Long Term Goals: To be accomplished in  4-6  weeks: 
1 Patient to report >= 75% improvement in symptoms with ADLs. 2 Patient will be independent with finalized HEP/ self maintenance. 3 Increase FOTO score >= 58% to indicate improved function with use of L knee/ LE. 
4 Restore full strength for improved ADL participation- squatting, kneeling, prolonged standing. Frequency / Duration:   Patient to be seen  2  times per week for 4-6  weeks: 
Patient / Caregiver education and instruction: self care, activity modification and exercises Therapist Signature: Mike Cash PT Date: 4/8/2019 Certification Period:  Time: 9:22 AM  
=================================================================== I certify that the above Physical Therapy Services are being furnished while the patient is under my care. I agree with the treatment plan and certify that this therapy is necessary. Physician Signature:       Date:      Time:  Please sign and return to [facility] or you may fax the signed copy to [phone]. Thank you.

## 2019-04-11 ENCOUNTER — HOSPITAL ENCOUNTER (OUTPATIENT)
Dept: PHYSICAL THERAPY | Age: 54
Discharge: HOME | End: 2019-04-11
Payer: COMMERCIAL

## 2019-04-11 PROCEDURE — 97140 MANUAL THERAPY 1/> REGIONS: CPT

## 2019-04-11 PROCEDURE — 97032 APPL MODALITY 1+ESTIM EA 15: CPT

## 2019-04-11 NOTE — PROGRESS NOTES
PHYSICAL THERAPY - DAILY TREATMENT NOTE - DRY NEEDLE NOTE Patient Name: Hollie Plascencia        Date: 2019 : 1965   YES  Patient  Verified Visit #:   2   of     Insurance: Payor: BLUE CROSS / Plan: [facility] PPO / Product Type: PPO / In time: 900 Out time: 950 Total Treatment Time: 50 Medicare Time Tracking (below) Total Timed Codes (min):  40 1:1 Treatment Time:  30 TREATMENT AREA = Left leg pain [M79.605] SUBJECTIVE Pain Level (on 0 to 10 scale):  0  / 10 Medication Changes/New allergies or changes in medical history, any new surgeries or procedures? NO    If yes, update Summary List  
Subjective Functional Status/Changes:  []  No changes reported Did well after 1st visit. Sl soreness after DN OBJECTIVE Modalities Rationale:     decrease pain, increase tissue extensibility, decrease trigger point density and increase stability/ ROM within muscle to improve patient's ability to perform ADLs/ return to PLOF 10 min [x] Estim, type/location:Direct using Pointer Excel II ( between 1-16 HZ) to DN [x]  att     []  unatt     [x]  W/dry needling     []  w/ice    []  w/heat 
 min []  Mechanical Traction: type/lbs   
               []  pro   []  sup   []  int   []  cont    []  before manual    []  after manual  
 min []  Ultrasound, settings/location:    
 min []  Iontophoresis w/ dexamethasone, location:   
                                           []  take home patch       []  in clinic  
10 min [x]  Ice     []  Heat    location/position:   
 min []  Vasopneumatic Device, press/temp:   
 min []  Other:   
[x] Skin assessment post-treatment (if applicable):   
[x]  intact    []  redness- no adverse reaction    
[]redness  adverse reaction:     
 
10 min Manual Therapy: Palpation, assessment of TP and DN -(needle insertion time not included)  Rationale:     decrease pain, increase tissue extensibility, decrease trigger point density and increase stability within muscle to improve patient's ability to perform ADLs/ return to PLOF 
10 1:1, 20 min Therapeutic Exercise:  [x]  See flow sheet Modalities Rationale:     decrease pain, increase tissue extensibility, decrease trigger point density and increase stability within muscle to improve patient's ability to perform ADLs/ return to PLOF Billed With/As: 
 [] TE 
 [x] MT 
 [] Neuro [x] modalities Patient Education: [x] Review HEP [] Progressed/Changed HEP based on:  
[] positioning   [] body mechanics   [x] indication/ precautions/ expectations of DN 
  [] heat/ice application   
[] other: OTHER OBJECTIVE/FUNCTIONAL MEASURES: 
 
Dry Needling Procedure Note Dry Needle Session Number:  2 Procedure: An intramuscular manual therapy (dry needling) and a neuro-muscular re-education treatment was done to deactivate myofascial trigger points, with a 15/30 gauge solid filament needle, under aseptic technique. Indication(s): [] Muscle spasms [x] Myalgia/Myositis  [] Muscle cramps  
   [] Muscle imbalances [] TMD (TMJ) [x] Myofascial pain & dysfunction 
   [] Other: __ Chart reviewed for the following: 
Yris TSANG PT, have reviewed the medical history, summary list and precautions/contraindications for [employer]. TIME OUT performed immediately prior to start of procedure: 
905 (enter time the timeout was completed) Yris TSANG PT, have performed the following reviews on [employer] prior to the start of the session:     
[x] Patient was identified by name and date of birth   
[x] Agreement on all muscles being treated was verified  
[x] Purpose of dry needling, side effects, possible complications, risks and benefits were explained to the patient [x] Procedure site(s) verified 
[x] Patient was positioned for comfort and draped for privacy [x] Informed Consent was signed (initial visit) and verified verbally (subsequent visits) [x] Patient was instructed on the need to report the use of blood thinners and/or immunosuppressant medications [x] How to respond to possible adverse effects of treatment 
[x] Self treatment of post needling soreness: ice, heat (moist heat, heat wraps) and stretching 
[x] Opportunity was given to ask any questions, all questions were answered Treatment: The following muscles were treated today (needle insertion with with direct ESTIM): 
 
Right:   
Left: HS/ G-S, popliteus Patients response to todays treatment:  
[x]  LTRs  [x]  Muscle Relaxation  []  Pain Relief  []  Decreased Tinnitus 
[]  Decreased HAs [x]  Post needling soreness []  Increased ROM []  Other:   
 
 
  
Post Treatment Pain Level (on 0 to 10) scale:   0  / 10 ASSESSMENT Assessment/Changes in Function:  
 
Progressed HEP Sign TP lat gastroc 
  
[]  See Progress Note/Recertification Patient will continue to benefit from skilled PT services to modify and progress therapeutic interventions, address functional mobility deficits, address ROM deficits, address strength deficits, analyze and address soft tissue restrictions and analyze and cue movement patterns to attain remaining goals. Progress toward goals / Updated goals: 
Progressed HEP  
 
PLAN [x]  Upgrade activities as tolerated YES  Continue plan of care  
[]  Discharge due to :   
[]  Other:   
 
Therapist: Nathalia Talavera PT Date: 4/11/2019 Time: 9:03 AM  
 
Future Appointments Date Time Provider Rigo Crane 4/22/2019  4:00 PM Dorsie Curling, PT [facility]  
4/25/2019 10:30 AM Dorsie Curling, PT [facility]  
4/29/2019 10:00 AM Dorsie Curling, PT [facility]  
5/1/2019 10:00 AM Dorsie Curling, PT [facility]  
5/8/2019 10:00 AM Dorsie Curling, PT [facility]  
5/10/2019 10:00 AM Dorsie Curling, PT [facility]  
5/13/2019 10:00 AM Dorsie Curling, PT [facility]  
5/16/2019  9:00 AM Dorsie Curling, PT [facility]

## 2019-04-22 ENCOUNTER — APPOINTMENT (OUTPATIENT)
Dept: PHYSICAL THERAPY | Age: 54
End: 2019-04-22
Payer: COMMERCIAL

## 2019-04-24 ENCOUNTER — HOSPITAL ENCOUNTER (OUTPATIENT)
Dept: PHYSICAL THERAPY | Age: 54
Discharge: HOME | End: 2019-04-24
Payer: COMMERCIAL

## 2019-04-24 PROCEDURE — 97140 MANUAL THERAPY 1/> REGIONS: CPT

## 2019-04-24 PROCEDURE — 97110 THERAPEUTIC EXERCISES: CPT

## 2019-04-24 NOTE — PROGRESS NOTES
PHYSICAL THERAPY - DAILY TREATMENT NOTE - DRY NEEDLE NOTE Patient Name: Jasmyne Guzman        Date: 2019 : 1965   YES  Patient  Verified Visit #:   3   of     Insurance: Payor: BLUE CROSS / Plan: [facility] PPO / Product Type: PPO / In time: 1000 Out time: 1045 Total Treatment Time: 45 Medicare Time Tracking (below) Total Timed Codes (min):  35 1:1 Treatment Time:  35 TREATMENT AREA = Left leg pain [M79.605] SUBJECTIVE Pain Level (on 0 to 10 scale): 3-4 / 10 Medication Changes/New allergies or changes in medical history, any new surgeries or procedures? NO    If yes, update Summary List  
Subjective Functional Status/Changes:  []  No changes reported Sign pain standing at work yesterday--9/10. Last 3 days have been bad,  Difficulty lying on back at night. Don't know what made it worse OBJECTIVE Modalities Rationale:     decrease pain, increase tissue extensibility, decrease trigger point density and increase stability/ ROM within muscle to improve patient's ability to perform ADLs/ return to PLOF 
 min [] Estim, type/location:   
                                 [x]  att     []  unatt     [x]  W/dry needling     []  w/ice    []  w/heat 
 min []  Mechanical Traction: type/lbs   
               []  pro   []  sup   []  int   []  cont    []  before manual    []  after manual  
 min []  Ultrasound, settings/location:    
 min []  Iontophoresis w/ dexamethasone, location:   
                                           []  take home patch       []  in clinic  
10 min [x]  Ice     []  Heat    location/position:   
 min []  Vasopneumatic Device, press/temp:   
 min []  Other:   
[x] Skin assessment post-treatment (if applicable):   
[x]  intact    []  redness- no adverse reaction    
[]redness  adverse reaction:     
 
15 min Manual Therapy: Palpation, STM/ TPR to popliteus/ lat HS region Rationale:     decrease pain, increase tissue extensibility, decrease trigger point density and increase stability within muscle to improve patient's ability to perform ADLs/ return to PLOF 20 min Therapeutic Exercise:  [x]  See flow sheet Modalities Rationale:     decrease pain, increase tissue extensibility, decrease trigger point density and increase stability within muscle to improve patient's ability to perform ADLs/ return to PLOF Billed With/As: 
 [] TE 
 [x] MT 
 [] Neuro [x] modalities Patient Education: [x] Review HEP [] Progressed/Changed HEP based on:  
[] positioning   [] body mechanics   [x] indication/ precautions/ expectations of DN 
  [] heat/ice application   
[] other: OTHER OBJECTIVE/FUNCTIONAL MEASURES: 
 
 
 
Tender/ tone poptiteus/ lat HS insertion Post Treatment Pain Level (on 0 to 10) scale:   0  / 10 ASSESSMENT Assessment/Changes in Function:  
 
Difficulty with prolonged standing 
  
[]  See Progress Note/Recertification Patient will continue to benefit from skilled PT services to modify and progress therapeutic interventions, address functional mobility deficits, address ROM deficits, address strength deficits, analyze and address soft tissue restrictions and analyze and cue movement patterns to attain remaining goals. Progress toward goals / Updated goals: 
Inc pain in recent days PLAN [x]  Upgrade activities as tolerated YES  Continue plan of care  
[]  Discharge due to :   
[]  Other:   
 
Therapist: Anne Morales PT Date: 4/24/2019 Time: 9:59 AM  
 
Future Appointments Date Time Provider Rigo Crane 4/24/2019 10:00 AM Ct Patel PT [facility]  
4/25/2019 10:30 AM Ct Patel PT [facility]  
4/29/2019 10:00 AM Ct Patel PT [facility]  
5/1/2019 10:00 AM Ct Patel PT [facility]  
5/8/2019 10:00 AM Ct Patel PT [facility]  
5/10/2019 10:00 AM Ct Patel PT [facility]  
 5/13/2019 10:00 AM Anabelle Baker, PT [facility]  
5/16/2019  9:00 AM Anabelle Baker PT [facility]

## 2019-04-25 ENCOUNTER — HOSPITAL ENCOUNTER (OUTPATIENT)
Dept: PHYSICAL THERAPY | Age: 54
Discharge: HOME | End: 2019-04-25
Payer: COMMERCIAL

## 2019-04-25 PROCEDURE — 97032 APPL MODALITY 1+ESTIM EA 15: CPT

## 2019-04-25 PROCEDURE — 97140 MANUAL THERAPY 1/> REGIONS: CPT

## 2019-04-25 NOTE — PROGRESS NOTES
PHYSICAL THERAPY - DAILY TREATMENT NOTE - DRY NEEDLE NOTE Patient Name: Irais Capellan        Date: 2019 : 1965   YES  Patient  Verified Visit #:   4     Insurance: Payor: BLUE CROSS / Plan: [facility] PPO / Product Type: PPO / In time: 1030 Out time: 1120 Total Treatment Time: 50 Medicare Time Tracking (below) Total Timed Codes (min):  40 1:1 Treatment Time:  30 TREATMENT AREA = Left leg pain [M79.605] SUBJECTIVE Pain Level (on 0 to 10 scale):  6  / 10 Medication Changes/New allergies or changes in medical history, any new surgeries or procedures? NO    If yes, update Summary List  
Subjective Functional Status/Changes:  []  No changes reported More sore and tender. Pain makes me want to sit down OBJECTIVE Modalities Rationale:     decrease pain, increase tissue extensibility, decrease trigger point density and increase stability/ ROM within muscle to improve patient's ability to perform ADLs/ return to PLOF 10 min [x] Estim, type/location:Direct using Pointer Excel II ( between 1-16 HZ) to DN [x]  att     []  unatt     [x]  W/dry needling     []  w/ice    []  w/heat 
 min []  Mechanical Traction: type/lbs   
               []  pro   []  sup   []  int   []  cont    []  before manual    []  after manual  
 min []  Ultrasound, settings/location:    
 min []  Iontophoresis w/ dexamethasone, location:   
                                           []  take home patch       []  in clinic  
 min []  Ice     []  Heat    location/position:   
 min []  Vasopneumatic Device, press/temp:   
 min []  Other:   
[x] Skin assessment post-treatment (if applicable):   
[x]  intact    []  redness- no adverse reaction    
[]redness  adverse reaction:     
 
10 min Manual Therapy: Palpation, assessment of TP and DN -(needle insertion time not included) Rationale:     decrease pain, increase tissue extensibility, decrease trigger point density and increase stability within muscle to improve patient's ability to perform ADLs/ return to PLOF 
10 1:1, 20 min Therapeutic Exercise:  [x]  See flow sheet Modalities Rationale:     decrease pain, increase tissue extensibility, decrease trigger point density and increase stability within muscle to improve patient's ability to perform ADLs/ return to PLOF Billed With/As: 
 [] TE 
 [x] MT 
 [] Neuro [x] modalities Patient Education: [x] Review HEP [] Progressed/Changed HEP based on:  
[] positioning   [] body mechanics   [x] indication/ precautions/ expectations of DN 
  [] heat/ice application   
[] other: OTHER OBJECTIVE/FUNCTIONAL MEASURES: 
 
Dry Needling Procedure Note Dry Needle Session Number:  3 Procedure: An intramuscular manual therapy (dry needling) and a neuro-muscular re-education treatment was done to deactivate myofascial trigger points, with a 15/30 gauge solid filament needle, under aseptic technique. Indication(s): [] Muscle spasms [] Myalgia/Myositis  [] Muscle cramps  
   [] Muscle imbalances [] TMD (TMJ) [] Myofascial pain & dysfunction 
   [] Other: __ Chart reviewed for the following: 
IDeysi PT, have reviewed the medical history, summary list and precautions/contraindications for [employer]. TIME OUT performed immediately prior to start of procedure: 
1035 (enter time the timeout was completed) Deysi TSANG PT, have performed the following reviews on [employer] prior to the start of the session:     
[x] Patient was identified by name and date of birth   
[x] Agreement on all muscles being treated was verified  
[x] Purpose of dry needling, side effects, possible complications, risks and benefits were explained to the patient [x] Procedure site(s) verified 
[x] Patient was positioned for comfort and draped for privacy [x] Informed Consent was signed (initial visit) and verified verbally (subsequent visits) [x] Patient was instructed on the need to report the use of blood thinners and/or immunosuppressant medications [x] How to respond to possible adverse effects of treatment 
[x] Self treatment of post needling soreness: ice, heat (moist heat, heat wraps) and stretching 
[x] Opportunity was given to ask any questions, all questions were answered Treatment: The following muscles were treated today (needle insertion with with direct ESTIM): 
 
Right:   
Left: Med/ lat HS/ popliteus Patients response to todays treatment:  
[x]  LTRs  [x]  Muscle Relaxation  []  Pain Relief  []  Decreased Tinnitus 
[]  Decreased HAs [x]  Post needling soreness []  Increased ROM []  Other:   
 
 
  
Post Treatment Pain Level (on 0 to 10) scale:   0  / 10 ASSESSMENT Assessment/Changes in Function:  
 
Inc pain intensity this day ; worse with standing 
  
[]  See Progress Note/Recertification Patient will continue to benefit from skilled PT services to modify and progress therapeutic interventions, address functional mobility deficits, address ROM deficits, address strength deficits, analyze and address soft tissue restrictions and analyze and cue movement patterns to attain remaining goals. Progress toward goals / Updated goals: 
Slow progress PLAN [x]  Upgrade activities as tolerated YES  Continue plan of care  
[]  Discharge due to :   
[]  Other:   
 
Therapist: Jesica Watts PT Date: 4/25/2019 Time: 10:34 AM  
 
Future Appointments Date Time Provider Rigo Crane 4/29/2019 10:00 AM Sheron Espinoza [facility]  
5/1/2019 10:00 AM Sheron Espinoza [facility]  
5/8/2019 10:00 AM Sheron Espinoza [facility]  
5/10/2019 10:00 AM Sheron Espinoza [facility]  
5/13/2019 10:00 AM Sheron Espinoza [facility]  
5/16/2019  9:00 AM Sheron Espinoza PT [facility]

## 2019-04-29 ENCOUNTER — HOSPITAL ENCOUNTER (OUTPATIENT)
Dept: PHYSICAL THERAPY | Age: 54
Discharge: HOME | End: 2019-04-29
Payer: COMMERCIAL

## 2019-04-29 PROCEDURE — 97140 MANUAL THERAPY 1/> REGIONS: CPT

## 2019-04-29 PROCEDURE — 97110 THERAPEUTIC EXERCISES: CPT

## 2019-05-01 ENCOUNTER — HOSPITAL ENCOUNTER (OUTPATIENT)
Dept: PHYSICAL THERAPY | Age: 54
Discharge: HOME | End: 2019-05-01
Payer: COMMERCIAL

## 2019-05-01 PROCEDURE — 97140 MANUAL THERAPY 1/> REGIONS: CPT

## 2019-05-01 PROCEDURE — 97110 THERAPEUTIC EXERCISES: CPT

## 2019-05-08 ENCOUNTER — APPOINTMENT (OUTPATIENT)
Dept: PHYSICAL THERAPY | Age: 54
End: 2019-05-08
Payer: COMMERCIAL

## 2019-05-10 ENCOUNTER — APPOINTMENT (OUTPATIENT)
Dept: PHYSICAL THERAPY | Age: 54
End: 2019-05-10
Payer: COMMERCIAL

## 2019-05-13 ENCOUNTER — HOSPITAL ENCOUNTER (OUTPATIENT)
Dept: PHYSICAL THERAPY | Age: 54
Discharge: HOME | End: 2019-05-13
Payer: COMMERCIAL

## 2019-05-13 PROCEDURE — 97140 MANUAL THERAPY 1/> REGIONS: CPT

## 2019-05-13 PROCEDURE — 97110 THERAPEUTIC EXERCISES: CPT

## 2019-05-16 ENCOUNTER — APPOINTMENT (OUTPATIENT)
Dept: PHYSICAL THERAPY | Age: 54
End: 2019-05-16
Payer: COMMERCIAL
